# Patient Record
Sex: MALE | Race: WHITE | URBAN - METROPOLITAN AREA
[De-identification: names, ages, dates, MRNs, and addresses within clinical notes are randomized per-mention and may not be internally consistent; named-entity substitution may affect disease eponyms.]

---

## 2022-03-19 PROBLEM — Z79.4 UNCONTROLLED TYPE 2 DIABETES MELLITUS WITH HYPERGLYCEMIA, WITH LONG-TERM CURRENT USE OF INSULIN (HCC): Status: ACTIVE | Noted: 2018-01-05

## 2022-03-19 PROBLEM — E11.65 UNCONTROLLED TYPE 2 DIABETES MELLITUS WITH HYPERGLYCEMIA, WITH LONG-TERM CURRENT USE OF INSULIN (HCC): Status: ACTIVE | Noted: 2018-01-05

## 2022-05-11 LAB
AVERAGE GLUCOSE: ABNORMAL
HBA1C MFR BLD: 7.6 %

## 2022-10-21 ENCOUNTER — OFFICE VISIT (OUTPATIENT)
Dept: ENDOCRINOLOGY | Age: 52
End: 2022-10-21

## 2022-10-21 VITALS
OXYGEN SATURATION: 99 % | WEIGHT: 204.2 LBS | SYSTOLIC BLOOD PRESSURE: 138 MMHG | HEART RATE: 71 BPM | BODY MASS INDEX: 29.3 KG/M2 | DIASTOLIC BLOOD PRESSURE: 90 MMHG

## 2022-10-21 DIAGNOSIS — E11.65 UNCONTROLLED TYPE 2 DIABETES MELLITUS WITH HYPERGLYCEMIA, WITH LONG-TERM CURRENT USE OF INSULIN (HCC): Primary | ICD-10-CM

## 2022-10-21 DIAGNOSIS — E23.0 HYPOGONADOTROPIC HYPOGONADISM IN MALE (HCC): ICD-10-CM

## 2022-10-21 DIAGNOSIS — E78.2 MIXED HYPERLIPIDEMIA: ICD-10-CM

## 2022-10-21 DIAGNOSIS — I10 ESSENTIAL HYPERTENSION: ICD-10-CM

## 2022-10-21 DIAGNOSIS — Z79.4 UNCONTROLLED TYPE 2 DIABETES MELLITUS WITH HYPERGLYCEMIA, WITH LONG-TERM CURRENT USE OF INSULIN (HCC): Primary | ICD-10-CM

## 2022-10-21 LAB — HBA1C MFR BLD: 7.3 %

## 2022-10-21 RX ORDER — BLOOD-GLUCOSE TRANSMITTER
EACH MISCELLANEOUS
COMMUNITY
Start: 2022-08-22 | End: 2022-10-21

## 2022-10-21 RX ORDER — METFORMIN HYDROCHLORIDE 500 MG/1
1000 TABLET, EXTENDED RELEASE ORAL 2 TIMES DAILY WITH MEALS
Qty: 360 TABLET | Refills: 3 | Status: SHIPPED | OUTPATIENT
Start: 2022-10-21

## 2022-10-21 RX ORDER — INSULIN GLARGINE 100 [IU]/ML
40 INJECTION, SOLUTION SUBCUTANEOUS NIGHTLY
Qty: 30 ML | Refills: 3 | Status: SHIPPED | OUTPATIENT
Start: 2022-10-21

## 2022-10-21 RX ORDER — BLOOD-GLUCOSE SENSOR
EACH MISCELLANEOUS
COMMUNITY
Start: 2022-08-20 | End: 2022-10-21

## 2022-10-21 RX ORDER — TESTOSTERONE ENANTHATE 75 MG/.5ML
75 INJECTION SUBCUTANEOUS
Qty: 6 ML | Refills: 3 | Status: SHIPPED | OUTPATIENT
Start: 2022-10-21

## 2022-10-21 RX ORDER — BLOOD-GLUCOSE TRANSMITTER
EACH MISCELLANEOUS
Qty: 1 EACH | Refills: 3 | Status: SHIPPED | OUTPATIENT
Start: 2022-10-21

## 2022-10-21 RX ORDER — LISINOPRIL 10 MG/1
10 TABLET ORAL DAILY
Qty: 90 TABLET | Refills: 3 | Status: SHIPPED | OUTPATIENT
Start: 2022-10-21

## 2022-10-21 RX ORDER — PEN NEEDLE, DIABETIC 32 GX 1/4"
NEEDLE, DISPOSABLE MISCELLANEOUS
Qty: 300 EACH | Refills: 3 | Status: SHIPPED | OUTPATIENT
Start: 2022-10-21

## 2022-10-21 RX ORDER — FENOFIBRATE 145 MG/1
145 TABLET, COATED ORAL DAILY
Qty: 90 TABLET | Refills: 3 | Status: SHIPPED | OUTPATIENT
Start: 2022-10-21

## 2022-10-21 RX ORDER — TIRZEPATIDE 5 MG/.5ML
5 INJECTION, SOLUTION SUBCUTANEOUS WEEKLY
Qty: 6 ML | Refills: 3 | Status: SHIPPED | OUTPATIENT
Start: 2022-10-21

## 2022-10-21 RX ORDER — INSULIN ASPART 100 [IU]/ML
INJECTION, SOLUTION INTRAVENOUS; SUBCUTANEOUS
Qty: 30 ML | Refills: 3 | Status: SHIPPED | OUTPATIENT
Start: 2022-10-21

## 2022-10-21 RX ORDER — EMPAGLIFLOZIN 25 MG/1
25 TABLET, FILM COATED ORAL DAILY
Qty: 90 TABLET | Refills: 3 | Status: SHIPPED | OUTPATIENT
Start: 2022-10-21

## 2022-10-21 RX ORDER — BLOOD-GLUCOSE SENSOR
EACH MISCELLANEOUS
Qty: 3 EACH | Refills: 3 | Status: SHIPPED | OUTPATIENT
Start: 2022-10-21

## 2022-10-21 RX ORDER — GLIMEPIRIDE 1 MG/1
1 TABLET ORAL 2 TIMES DAILY
Qty: 180 TABLET | Refills: 3 | Status: SHIPPED | OUTPATIENT
Start: 2022-10-21

## 2022-10-21 RX ORDER — ATORVASTATIN CALCIUM 10 MG/1
10 TABLET, FILM COATED ORAL NIGHTLY
Qty: 90 TABLET | Refills: 3 | Status: SHIPPED | OUTPATIENT
Start: 2022-10-21

## 2022-10-21 RX ORDER — ICOSAPENT ETHYL 1000 MG/1
2 CAPSULE ORAL 2 TIMES DAILY
Qty: 360 CAPSULE | Refills: 3 | Status: SHIPPED | OUTPATIENT
Start: 2022-10-21

## 2022-10-21 ASSESSMENT — ENCOUNTER SYMPTOMS: SHORTNESS OF BREATH: 0

## 2022-10-21 NOTE — PROGRESS NOTES
Lucien Ramirez MD, [facility]  [address], [address] [facility], [address] [place] [address]  Phone [phone]  Facsimile [phone]          Enedelia Self is a 46 y.o. male seen 10/21/2022 for follow up of type 2 diabetes mellitus        ASSESSMENT AND PLAN:    1. Uncontrolled type 2 diabetes mellitus with hyperglycemia, with long-term current use of insulin (HCC)  His glycemic control is suboptimal and his hemoglobin A1c is not at goal less than 7. I will try switching him from [facility] to [facility] as below. Eye exam negative 8/2021 and I asked` him to make an appointment. Urine microalbumin negative 5/2022.    - LANTUS SOLOSTAR 100 UNIT/ML injection pen; Inject 40 Units into the skin nightly  Dispense: 30 mL; Refill: 3  - NOVOLOG FLEXPEN 100 UNIT/ML injection pen; 10 units with supper in addition to correction scale 3/50>150 (up to 20 units daily)  Dispense: 30 mL; Refill: 3  - MOUNJARO 5 MG/0.5ML SOPN SC injection; Inject 0.5 mLs into the skin once a week  Dispense: 6 mL; Refill: 3  - metFORMIN (GLUCOPHAGE-XR) 500 MG extended release tablet; Take 2 tablets by mouth 2 times daily (with meals)  Dispense: 360 tablet; Refill: 3  - JARDIANCE 25 MG tablet; Take 1 tablet by mouth daily  Dispense: 90 tablet; Refill: 3  - glimepiride (AMARYL) 1 MG tablet; Take 1 tablet by mouth 2 times daily  Dispense: 180 tablet; Refill: 3  - Continuous Blood Gluc Sensor (DEXCOM G6 SENSOR) MISC; Change every 10 days  Dispense: 3 each; Refill: 3  - Continuous Blood Gluc Transmit (DEXCOM G6 TRANSMITTER) MISC; Change every 3 months  Dispense: 1 each; Refill: 3  - BD PEN NEEDLE MICRO U/F 32G X 6 MM MISC; 2-3 times per day  Dispense: 300 each; Refill: 3    2. Essential hypertension  Blood pressure not at goal less than 140/90, but it is normally well controlled. - lisinopril (PRINIVIL;ZESTRIL) 10 MG tablet; Take 1 tablet by mouth daily  Dispense: 90 tablet; Refill: 3    3.  Mixed hyperlipidemia  LDL at goal 5/2022. His triglycerides will hopefully improve with better glycemic control.    - atorvastatin (LIPITOR) 10 MG tablet; Take 1 tablet by mouth at bedtime  Dispense: 90 tablet; Refill: 3  - fenofibrate (TRICOR) 145 MG tablet; Take 1 tablet by mouth daily  Dispense: 90 tablet; Refill: 3  - VASCEPA 1 g CAPS capsule; Take 2 capsules by mouth 2 times daily  Dispense: 360 capsule; Refill: 3    4. Hypogonadotropic hypogonadism in male [facility])  His testosterone level was normal 5/2022. Hemoglobin and hematocrit normal 5/2022. PSA normal 5/2022.      - XYOSTED 75 MG/0.5ML SOAJ; Inject 75 mg into the skin every 7 days  Dispense: 6 mL; Refill: 3                 Procedures:     Interpretation of 72 hour glucose monitor: At least 72 hours of data were reviewed. The patient utilizes a Dexcom G6 continuous glucose monitoring system. The average glucose during the reviewed timeframe was 163 with a standard deviation of 47 (time in range 68%, hyperglycemia 31%, hypoglycemia less than 1%). He is having mild daytime hyperglycemia, significant postprandial hyperglycemia after supper and mild overnight hyperglycemia      Follow-up and Dispositions    Return in about 4 months (around 2/21/2023). HISTORY OF PRESENT ILLNESS:    DIABETES MELLITUS     Diagnosis: Type 2 diabetes mellitus diagnosed 2007. Diet: He is trying to limit carbohydrates, avoids simple sugars for the most part, no sweet tea or regular soft drinks. Exercise: No regular exercise. Monitoring: Dexcom G6. Blood glucose: By review of Dexcom G6: Average 163 (time in range 68%, hyperglycemia 31%). Hypoglycemia: +Hypoglycemic awareness. Hypoglycemia <1% per Lay Guillen. Hemoglobin A1c:   2/4/2011: 7.8.   5/9/2011: 7.0.   10/21/2011: 8.4.   1/20/2012: 9.3.   6/15/2012: 7.4.   7/13/2012: 8.3.   1/11/2013: 8.4.   5/10/2013: 7.5.   8/9/2013: 8.4.   10/2/2013: 8.5.   7/11/2014: 6.9.   10/31/2014: 7.2.   2/27/2015: 6.9. 7/10/2015: 6.9.   10/30/2015: 6.4.  10/30/2015: 6.3.  3/7/2016: 7.3.  7/13/2016: 6.7.  12/12/2016: 6.8.  4/20/2017: 6.6.    8/22/2017: 6.9.  1/5/2018: 7.4.  4/25/2018: 7.1.  9/28/2018: 6.7.  2/6/2019: 6.4.  7/12/2019: 6.7.  12/5/2019: 7.4.  4/21/2020: 7.2.  2/23/2021: 7.7.  6/25/2021: 6.5.  12/3/2021: 7.6.  5/11/2022: 7.6.  10/21/2022: 7.3. Microalbumin/Nephropathy:   1/10/2014: Cr 0.8 (GFR >90). 7/11/2014: Urine microalbumin/Cr 8.6.   1/9/2015: Cr 0.9 (GFR >90). 3/7/2016: Creatinine 0.9 (GFR >90) urine microalbumin/Cr 22.4.  7/13/2016: Creatinine 1.0 (GFR 85.1). 4/20/2017: Creatinine 1.0 (GFR 84.8), urine microalbumin/creatinine 4.1.    8/22/2017: Creatinine 1.1 (GFR 75.9), urine microalbumin/creatinine 6.8.  4/25/2018: Creatinine 0.9 (GFR 95.3), urine micro albumin/creatinine 2.8.  2/6/2019: Creatinine 1.0 (GFR 84.1), urine microalbumin/creatinine 4.3.  12/5/2019: Creatinine 0.9 (GFR 94.9), urine microalbumin/creatinine 3.7.   4/21/2020: Creatinine 1.0 (GFR 80.9). 2/23/2021: Creatinine 0.9 (.6), urine microalbumin/creatinine 2.6.  5/11/2022: Creatinine 0.9 (GFR 92.6), urine microalbumin/creatinine 2.4. Neuropathy/foot complications: Denies numbness, tingling, burning of feet. Cardiac issues: Coronary calcium score 1/2012 - Zero. Lipids:  2/4/2011: , , HDL 33.   5/9/2011: , , HDL 40, LDL 34, LFTs normal.   10/21/2011: , , HDL 33.   1/20/2012: , , HDL 35.   1/11/2013: , , HDL 43, LDL18.8.   8/9/2013: , , HDL 32, LDL 20, VLDL 96.   10/2/2013: , , HDL 41.   1/10/2014: , , HDL 38, LDL 53.2, VLDL 41.8.   1/9/2015: , , HDL 36, LDL 49, VLDL 58.   3/7/2016: Total cholesterol 187, triglycerides 655, HDL 35, LDL 66.  7/13/2016: Total cholesterol 137, triglycerides 275, HDL 30, LDL 52, VLDL 55.  4/20/2017:  Total cholesterol 147, triglycerides 155, HDL 34, LDL 82, VLDL 31.    8/22/2017: Total cholesterol 148, triglycerides 376, HDL 30, LDL 42.8, VLDL 75.2 (out of fenofibrate). 4/25/2018: Total cholesterol 140, triglycerides 292, HDL 32, LDL 49.6.  2/6/2019: Total cholesterol 131, triglycerides 140, HDL 37, LDL 66, VLDL 28.  12/5/2019: Total cholesterol 148, triglycerides 251, HDL 39, LDL 58.8, VLDL 50.2.  4/21/2020: Total cholesterol 140, triglycerides 195, HDL 44, LDL 57, VLDL 39.  2/23/2021: Total cholesterol 193, triglycerides 307, HDL 39, LDL 92.6, VLDL 61.4.  5/11/2022: Total cholesterol 181, triglycerides 381, , LDL 73.8, VLDL 76.2.    TSH:   7/13/2012: TSH 1.20.   8/9/2013: TSH 1.560.   3/7/2016: TSH 1.55.  4/20/2017: TSH 1.51.    8/22/2017: TSH 2.15.  4/25/2018: TSH 2.  2/6/2019: TSH 2.29.  12/5/2019: TSH 1.71.  5/11/2022: TSH 1.680. Retinopathy: Eye exam 8/2021 - no retinopathy. HYPOGONADISM    Presentation: Hypogonadotropic hypogonadism. Treatment: He was taking Androgel 1.62% and his PCP switched him to Axiron due to insurance. His PCP lowered his Axiron in 8/2015 when his hemoglobin was high. He saw urology regarding his rising PSA and his exam was normal.  His repeat PSA was lower and his urologist recommended observation. He switched from [address] [place] [address] to [facility] 2/2021. Labs:   5/9/2011: total testosterone 162 (250-1100), free testosterone 38.2 (), LH 4.0, FSH 5.4, prolactin 8.4, TSH 1.84, ferritin 149.   10/21/2011: total testosterone 272 (398-3941), free testosterone 6.66 (6-21). 1/20/2012: total testosterone 736.   7/13/2012: total testosterone 517, free testosterone 181.5.   1/11/2013: total testosterone 379, free testosterone 8.9.   8/9/2013: total testosterone 211.28.   1/10/2014: total testosterone 348, PSA 0.885, H/H 16.1/47.5.   1/9/2015: total testosterone 281, PSA 0.757, H/H 16.2/47. 30.   8/7/2015: Total testosterone 368, hemoglobin 18.6, hematocrit 54.3, PSA 0.780.  10/30/2015:  Total testosterone 362.  11/16/2015: Hemoglobin 17.7, hematocrit 52.1.  3/7/2016: Total testosterone 499, hemoglobin 17.3, hematocrit 49.9.  12/12/2016: Total testosterone 498, hemoglobin 16.6, hematocrit 49, PSA 0.602.  4/20/2017: Total testosterone 485.    8/22/2017: Total testosterone 328.  4/25/2018: Total testosterone 268, hemoglobin 16.8, hematocrit 48.2, PSA 0.685  2/6/2019: Total testosterone 380, hemoglobin 16.6, hematocrit 51.1, PSA 2.58.  3/28/2019: PSA 1.38.  12/5/2019: Total testosterone 405, hemoglobin 16.3, hematocrit 48.4, PSA 0.959.  2/23/2021: Total testosterone 449, hemoglobin 16.2, hematocrit 47.6, PSA 1.25.  6/25/2021: Total testosterone 258.0.  5/11/2022: Total testosterone 407, hemoglobin 17.0, hematocrit 50.5, PSA 0.878. Review of Systems   Constitutional:  Negative for fatigue. Weight decreased 3 pounds since last visit. Respiratory:  Negative for shortness of breath. Cardiovascular:  Negative for chest pain. Vital Signs:  BP (!) 138/90   Pulse 71   Wt 204 lb 3.2 oz (92.6 kg)   SpO2 99%   BMI 29.30 kg/m²     Wt Readings from Last 3 Encounters:   10/21/22 204 lb 3.2 oz (92.6 kg)   05/13/22 207 lb (93.9 kg)   12/03/21 208 lb (94.3 kg)       Physical Exam  Constitutional:       General: He is not in acute distress. Neck:      Thyroid: No thyroid mass or thyromegaly. Cardiovascular:      Rate and Rhythm: Normal rate and regular rhythm. Comments: DP pulses 2+ bilaterally. No edema. Musculoskeletal:      Comments: Bilateral 1st MTP bunions. Lymphadenopathy:      Cervical: No cervical adenopathy. Skin:     Comments: No lesions on feet. +Onychomycosis. Neurological:      Motor: No tremor. Comments: Monofilament intact. Ankle reflexes decreased.           Orders Placed This Encounter   Procedures    AMB POC HEMOGLOBIN A1C    NH CONTINUOUS GLUCOSE MONITORING ANALYSIS I&R         Current Outpatient Medications   Medication Sig Dispense Refill    LANTUS SOLOSTAR 100 UNIT/ML injection pen Inject 40 Units into the skin nightly 30 mL 3    NOVOLOG FLEXPEN 100 UNIT/ML injection pen 10 units with supper in addition to correction scale 3/50>150 (up to 20 units daily) 30 mL 3    atorvastatin (LIPITOR) 10 MG tablet Take 1 tablet by mouth at bedtime 90 tablet 3    fenofibrate (TRICOR) 145 MG tablet Take 1 tablet by mouth daily 90 tablet 3    VASCEPA 1 g CAPS capsule Take 2 capsules by mouth 2 times daily 360 capsule 3    lisinopril (PRINIVIL;ZESTRIL) 10 MG tablet Take 1 tablet by mouth daily 90 tablet 3    XYOSTED 75 MG/0.5ML SOAJ Inject 75 mg into the skin every 7 days 6 mL 3    MOUNJARO 5 MG/0.5ML SOPN SC injection Inject 0.5 mLs into the skin once a week 6 mL 3    metFORMIN (GLUCOPHAGE-XR) 500 MG extended release tablet Take 2 tablets by mouth 2 times daily (with meals) 360 tablet 3    JARDIANCE 25 MG tablet Take 1 tablet by mouth daily 90 tablet 3    glimepiride (AMARYL) 1 MG tablet Take 1 tablet by mouth 2 times daily 180 tablet 3    Continuous Blood Gluc Sensor (DEXCOM G6 SENSOR) MISC Change every 10 days 3 each 3    Continuous Blood Gluc Transmit (DEXCOM G6 TRANSMITTER) MISC Change every 3 months 1 each 3    BD PEN NEEDLE MICRO U/F 32G X 6 MM MISC 2-3 times per day 300 each 3    aspirin 81 MG EC tablet Take by mouth daily       No current facility-administered medications for this visit.

## 2022-12-01 ENCOUNTER — TELEPHONE (OUTPATIENT)
Dept: ENDOCRINOLOGY | Age: 52
End: 2022-12-01

## 2022-12-01 DIAGNOSIS — Z79.4 UNCONTROLLED TYPE 2 DIABETES MELLITUS WITH HYPERGLYCEMIA, WITH LONG-TERM CURRENT USE OF INSULIN (HCC): Primary | ICD-10-CM

## 2022-12-01 DIAGNOSIS — E11.65 UNCONTROLLED TYPE 2 DIABETES MELLITUS WITH HYPERGLYCEMIA, WITH LONG-TERM CURRENT USE OF INSULIN (HCC): Primary | ICD-10-CM

## 2022-12-01 RX ORDER — TIRZEPATIDE 12.5 MG/.5ML
12.5 INJECTION, SOLUTION SUBCUTANEOUS WEEKLY
Qty: 2 ML | Refills: 0 | Status: SHIPPED | OUTPATIENT
Start: 2022-12-01 | End: 2022-12-23

## 2022-12-01 RX ORDER — TIRZEPATIDE 10 MG/.5ML
10 INJECTION, SOLUTION SUBCUTANEOUS WEEKLY
Qty: 2 ML | Refills: 0 | Status: SHIPPED | OUTPATIENT
Start: 2022-12-01 | End: 2022-12-23

## 2022-12-01 RX ORDER — TIRZEPATIDE 7.5 MG/.5ML
7.5 INJECTION, SOLUTION SUBCUTANEOUS WEEKLY
Qty: 2 ML | Refills: 0 | Status: SHIPPED | OUTPATIENT
Start: 2022-12-01 | End: 2022-12-23

## 2022-12-01 RX ORDER — TIRZEPATIDE 15 MG/.5ML
15 INJECTION, SOLUTION SUBCUTANEOUS WEEKLY
Qty: 6 ML | Refills: 3 | Status: SHIPPED | OUTPATIENT
Start: 2022-12-01

## 2022-12-01 NOTE — TELEPHONE ENCOUNTER
I would like for him to increase his Moujaro in 2.5 mg increments every 4 weeks as tolerated to a maximum dose of 15 mg weekly. He should let me know if he develops intolerable GI side effects with any of the doses.

## 2022-12-01 NOTE — TELEPHONE ENCOUNTER
Patient stated that the [facility] [place] not controlling his blood sugar as expected and would like the dose increased.

## 2022-12-01 NOTE — TELEPHONE ENCOUNTER
Left detailed message on personal voicemail explaining each dose and for patient to call if he begins to have any extreme GI side effects or issues at his pharmacy.

## 2023-02-17 ENCOUNTER — OFFICE VISIT (OUTPATIENT)
Dept: ENDOCRINOLOGY | Age: 53
End: 2023-02-17

## 2023-02-17 VITALS
OXYGEN SATURATION: 96 % | HEART RATE: 90 BPM | BODY MASS INDEX: 27.98 KG/M2 | WEIGHT: 195 LBS | DIASTOLIC BLOOD PRESSURE: 78 MMHG | SYSTOLIC BLOOD PRESSURE: 106 MMHG

## 2023-02-17 DIAGNOSIS — E23.0 HYPOGONADOTROPIC HYPOGONADISM IN MALE (HCC): ICD-10-CM

## 2023-02-17 DIAGNOSIS — Z79.4 UNCONTROLLED TYPE 2 DIABETES MELLITUS WITH HYPERGLYCEMIA, WITH LONG-TERM CURRENT USE OF INSULIN (HCC): Primary | ICD-10-CM

## 2023-02-17 DIAGNOSIS — E78.2 MIXED HYPERLIPIDEMIA: ICD-10-CM

## 2023-02-17 DIAGNOSIS — I10 ESSENTIAL HYPERTENSION: ICD-10-CM

## 2023-02-17 DIAGNOSIS — E11.65 UNCONTROLLED TYPE 2 DIABETES MELLITUS WITH HYPERGLYCEMIA, WITH LONG-TERM CURRENT USE OF INSULIN (HCC): Primary | ICD-10-CM

## 2023-02-17 LAB — HBA1C MFR BLD: 7.1 %

## 2023-02-17 RX ORDER — FENOFIBRATE 145 MG/1
145 TABLET, COATED ORAL DAILY
Qty: 90 TABLET | Refills: 3 | Status: SHIPPED | OUTPATIENT
Start: 2023-02-17

## 2023-02-17 RX ORDER — LIRAGLUTIDE 6 MG/ML
1.8 INJECTION SUBCUTANEOUS DAILY
COMMUNITY
Start: 2016-03-11 | End: 2023-02-17

## 2023-02-17 RX ORDER — PEN NEEDLE, DIABETIC 32 GX 1/4"
NEEDLE, DISPOSABLE MISCELLANEOUS
Qty: 300 EACH | Refills: 3 | Status: SHIPPED | OUTPATIENT
Start: 2023-02-17

## 2023-02-17 RX ORDER — BLOOD-GLUCOSE SENSOR
EACH MISCELLANEOUS
Qty: 3 EACH | Refills: 3 | Status: SHIPPED | OUTPATIENT
Start: 2023-02-17

## 2023-02-17 RX ORDER — TIRZEPATIDE 15 MG/.5ML
15 INJECTION, SOLUTION SUBCUTANEOUS WEEKLY
Qty: 6 ML | Refills: 3 | Status: SHIPPED | OUTPATIENT
Start: 2023-02-17

## 2023-02-17 RX ORDER — TIRZEPATIDE 7.5 MG/.5ML
7.5 INJECTION, SOLUTION SUBCUTANEOUS WEEKLY
Qty: 2 ML | Refills: 0 | Status: SHIPPED | OUTPATIENT
Start: 2023-02-17 | End: 2023-03-11

## 2023-02-17 RX ORDER — LISINOPRIL 10 MG/1
10 TABLET ORAL DAILY
Qty: 90 TABLET | Refills: 3 | Status: SHIPPED | OUTPATIENT
Start: 2023-02-17

## 2023-02-17 RX ORDER — LISINOPRIL 10 MG/1
10 TABLET ORAL DAILY
COMMUNITY
End: 2023-02-17

## 2023-02-17 RX ORDER — TIRZEPATIDE 12.5 MG/.5ML
12.5 INJECTION, SOLUTION SUBCUTANEOUS WEEKLY
Qty: 2 ML | Refills: 0 | Status: SHIPPED | OUTPATIENT
Start: 2023-02-17 | End: 2023-03-11

## 2023-02-17 RX ORDER — TESTOSTERONE ENANTHATE 75 MG/.5ML
75 INJECTION SUBCUTANEOUS
Qty: 6 ML | Refills: 3 | Status: SHIPPED | OUTPATIENT
Start: 2023-02-17

## 2023-02-17 RX ORDER — METFORMIN HYDROCHLORIDE 500 MG/1
1000 TABLET, EXTENDED RELEASE ORAL 2 TIMES DAILY WITH MEALS
Qty: 360 TABLET | Refills: 3 | Status: SHIPPED | OUTPATIENT
Start: 2023-02-17

## 2023-02-17 RX ORDER — ICOSAPENT ETHYL 1000 MG/1
2 CAPSULE ORAL 2 TIMES DAILY
Qty: 360 CAPSULE | Refills: 3 | Status: SHIPPED | OUTPATIENT
Start: 2023-02-17

## 2023-02-17 RX ORDER — ATORVASTATIN CALCIUM 10 MG/1
10 TABLET, FILM COATED ORAL NIGHTLY
Qty: 90 TABLET | Refills: 3 | Status: SHIPPED | OUTPATIENT
Start: 2023-02-17

## 2023-02-17 RX ORDER — EMPAGLIFLOZIN 25 MG/1
25 TABLET, FILM COATED ORAL DAILY
Qty: 90 TABLET | Refills: 3 | Status: SHIPPED | OUTPATIENT
Start: 2023-02-17

## 2023-02-17 RX ORDER — GLIMEPIRIDE 1 MG/1
1 TABLET ORAL 2 TIMES DAILY
Qty: 180 TABLET | Refills: 3 | Status: SHIPPED | OUTPATIENT
Start: 2023-02-17

## 2023-02-17 RX ORDER — INSULIN GLARGINE 100 [IU]/ML
40 INJECTION, SOLUTION SUBCUTANEOUS NIGHTLY
Qty: 30 ML | Refills: 3 | Status: SHIPPED | OUTPATIENT
Start: 2023-02-17

## 2023-02-17 RX ORDER — INSULIN ASPART 100 [IU]/ML
INJECTION, SOLUTION INTRAVENOUS; SUBCUTANEOUS
Qty: 30 ML | Refills: 3 | Status: SHIPPED | OUTPATIENT
Start: 2023-02-17

## 2023-02-17 RX ORDER — BLOOD-GLUCOSE TRANSMITTER
EACH MISCELLANEOUS
Qty: 1 EACH | Refills: 3 | Status: SHIPPED | OUTPATIENT
Start: 2023-02-17

## 2023-02-17 ASSESSMENT — ENCOUNTER SYMPTOMS: SHORTNESS OF BREATH: 0

## 2023-02-17 NOTE — PROGRESS NOTES
Lucien GOMES [facility], [place], [facility]  [address], [address] [facility], [address] [place] [address] [place]  Phone [phone]  Facsimile [phone]          Neftali Hernandez is a 45 y.o. male seen 2/17/2023 for follow up of type 2 diabetes mellitus        ASSESSMENT AND PLAN:    1. Uncontrolled type 2 diabetes mellitus with hyperglycemia, with long-term current use of insulin (HCC)  His glycemic control is suboptimal and his hemoglobin A1c is not at goal less than 7. I will again try switching him from [facility] to [facility] as below. Eye exam negative 8/2021 and he has an appointment scheduled 4/2023. Urine microalbumin negative 5/2022. Update labs prior to next visit. - LANTUS SOLOSTAR 100 UNIT/ML injection pen; Inject 40 Units into the skin nightly  Dispense: 30 mL; Refill: 3  - NOVOLOG FLEXPEN 100 UNIT/ML injection pen; 10 units with supper in addition to correction scale 3/50>150 (up to 20 units daily)  Dispense: 30 mL; Refill: 3  - MOUNJARO 7.5 MG/0.5ML SOPN SC injection; Inject 0.5 mLs into the skin once a week for 4 doses  Dispense: 2 mL; Refill: 0  - MOUNJARO 12.5 MG/0.5ML SOPN SC injection; Inject 0.5 mLs into the skin once a week for 4 doses  Dispense: 2 mL; Refill: 0  - MOUNJARO 15 MG/0.5ML SOPN SC injection; Inject 0.5 mLs into the skin once a week  Dispense: 6 mL; Refill: 3  - metFORMIN (GLUCOPHAGE-XR) 500 MG extended release tablet; Take 2 tablets by mouth 2 times daily (with meals)  Dispense: 360 tablet; Refill: 3  - JARDIANCE 25 MG tablet; Take 1 tablet by mouth daily  Dispense: 90 tablet; Refill: 3  - glimepiride (AMARYL) 1 MG tablet; Take 1 tablet by mouth 2 times daily  Dispense: 180 tablet; Refill: 3  - Continuous Blood Gluc Sensor (DEXCOM G6 SENSOR) MISC; Change every 10 days  Dispense: 3 each; Refill: 3  - Continuous Blood Gluc Transmit (DEXCOM G6 TRANSMITTER) MISC; Change every 3 months  Dispense: 1 each;  Refill: 3  - BD PEN NEEDLE MICRO U/F 32G X 6 MM MISC; 2-3 times per day  Dispense: 300 each; Refill: 3    2. Essential hypertension  Blood pressure at goal less than 130/80.    - lisinopril (PRINIVIL;ZESTRIL) 10 MG tablet; Take 1 tablet by mouth daily  Dispense: 90 tablet; Refill: 3    3. Mixed hyperlipidemia  LDL at goal 5/2022. His triglycerides will hopefully improve with better glycemic control.    - atorvastatin (LIPITOR) 10 MG tablet; Take 1 tablet by mouth at bedtime  Dispense: 90 tablet; Refill: 3  - fenofibrate (TRICOR) 145 MG tablet; Take 1 tablet by mouth daily  Dispense: 90 tablet; Refill: 3  - VASCEPA 1 g CAPS capsule; Take 2 capsules by mouth 2 times daily  Dispense: 360 capsule; Refill: 3    4. Hypogonadotropic hypogonadism in male [facility])  His testosterone level was normal 5/2022. Hemoglobin and hematocrit normal 5/2022. PSA normal 5/2022.      - XYOSTED 75 MG/0.5ML SOAJ; Inject 75 mg into the skin every 7 days  Dispense: 6 mL; Refill: 3               Procedures:     Interpretation of 72 hour glucose monitor: At least 72 hours of data were reviewed. The patient utilizes a Dexcom G6 continuous glucose monitoring system. The average glucose during the reviewed timeframe was 152 with a standard deviation of 50 (time in range 74%, hyperglycemia 24%, hypoglycemia less than 2%). He is having mild daytime and overnight hyperglycemia. Follow-up and Dispositions    Return in about 4 months (around 6/17/2023). HISTORY OF PRESENT ILLNESS:    DIABETES MELLITUS     Diagnosis: Type 2 diabetes mellitus diagnosed 2007. Diet: He is trying to limit carbohydrates, avoids simple sugars for the most part, no sweet tea or regular soft drinks. Exercise: No regular exercise. Monitoring: Dexcom G6. Blood glucose: Dexcom download reviewed. Average 152 (time in range 74%, hyperglycemia 24%). Hypoglycemia: +Hypoglycemic awareness. Hypoglycemia <2% per Sam.     Hemoglobin A1c:   2/4/2011: 7.8.   5/9/2011: 7.0.   10/21/2011: 8.4.   1/20/2012: 9.3.   6/15/2012: 7.4.   7/13/2012: 8.3.   1/11/2013: 8.4.   5/10/2013: 7.5.   8/9/2013: 8.4.   10/2/2013: 8.5.   7/11/2014: 6.9.   10/31/2014: 7.2.   2/27/2015: 6.9.   7/10/2015: 6.9.   10/30/2015: 6.4.  10/30/2015: 6.3.  3/7/2016: 7.3.  7/13/2016: 6.7.  12/12/2016: 6.8.  4/20/2017: 6.6.    8/22/2017: 6.9.  1/5/2018: 7.4.  4/25/2018: 7.1.  9/28/2018: 6.7.  2/6/2019: 6.4.  7/12/2019: 6.7.  12/5/2019: 7.4.  4/21/2020: 7.2.  2/23/2021: 7.7.  6/25/2021: 6.5.  12/3/2021: 7.6.  5/11/2022: 7.6.  10/21/2022: 7.3.  2/17/2023: 7.1. Microalbumin/Nephropathy:   1/10/2014: Cr 0.8 (GFR >90). 7/11/2014: Urine microalbumin/Cr 8.6.   1/9/2015: Cr 0.9 (GFR >90). 3/7/2016: Creatinine 0.9 (GFR >90) urine microalbumin/Cr 22.4.  7/13/2016: Creatinine 1.0 (GFR 85.1). 4/20/2017: Creatinine 1.0 (GFR 84.8), urine microalbumin/creatinine 4.1.    8/22/2017: Creatinine 1.1 (GFR 75.9), urine microalbumin/creatinine 6.8.  4/25/2018: Creatinine 0.9 (GFR 95.3), urine micro albumin/creatinine 2.8.  2/6/2019: Creatinine 1.0 (GFR 84.1), urine microalbumin/creatinine 4.3.  12/5/2019: Creatinine 0.9 (GFR 94.9), urine microalbumin/creatinine 3.7.   4/21/2020: Creatinine 1.0 (GFR 80.9). 2/23/2021: Creatinine 0.9 (.6), urine microalbumin/creatinine 2.6.  5/11/2022: Creatinine 0.9 (GFR 92.6), urine microalbumin/creatinine 2.4. Neuropathy/foot complications: Denies numbness, tingling, burning of feet. Cardiac issues: Coronary calcium score 1/2012 - Zero.       Lipids:  2/4/2011: , , HDL 33.   5/9/2011: , , HDL 40, LDL 34, LFTs normal.   10/21/2011: , , HDL 33.   1/20/2012: , , HDL 35.   1/11/2013: , , HDL 43, LDL18.8.   8/9/2013: , , HDL 32, LDL 20, VLDL 96.   10/2/2013: , , HDL 41.   1/10/2014: , , HDL 38, LDL 53.2, VLDL 41.8.   1/9/2015: , , HDL 36, LDL 49, VLDL 58.   3/7/2016: Total cholesterol 187, triglycerides 655, HDL 35, LDL 66.  7/13/2016: Total cholesterol 137, triglycerides 275, HDL 30, LDL 52, VLDL 55.  4/20/2017: Total cholesterol 147, triglycerides 155, HDL 34, LDL 82, VLDL 31.    8/22/2017: Total cholesterol 148, triglycerides 376, HDL 30, LDL 42.8, VLDL 75.2 (out of fenofibrate). 4/25/2018: Total cholesterol 140, triglycerides 292, HDL 32, LDL 49.6.  2/6/2019: Total cholesterol 131, triglycerides 140, HDL 37, LDL 66, VLDL 28.  12/5/2019: Total cholesterol 148, triglycerides 251, HDL 39, LDL 58.8, VLDL 50.2.  4/21/2020: Total cholesterol 140, triglycerides 195, HDL 44, LDL 57, VLDL 39.  2/23/2021: Total cholesterol 193, triglycerides 307, HDL 39, LDL 92.6, VLDL 61.4.  5/11/2022: Total cholesterol 181, triglycerides 381, , LDL 73.8, VLDL 76.2.    TSH:   7/13/2012: TSH 1.20.   8/9/2013: TSH 1.560.   3/7/2016: TSH 1.55.  4/20/2017: TSH 1.51.    8/22/2017: TSH 2.15.  4/25/2018: TSH 2.  2/6/2019: TSH 2.29.  12/5/2019: TSH 1.71.  5/11/2022: TSH 1.680. Retinopathy: Eye exam 8/2021 - no retinopathy. He has an appointment scheduled 4/7/2023. HYPOGONADISM    Presentation: Hypogonadotropic hypogonadism. Treatment: He was taking Androgel 1.62% and his PCP switched him to Axiron due to insurance. His PCP lowered his Axiron in 8/2015 when his hemoglobin was high. He saw urology regarding his rising PSA and his exam was normal.  His repeat PSA was lower and his urologist recommended observation. He switched from [address] [place] [address] to [facility] 2/2021. Labs:   5/9/2011: total testosterone 162 (250-1100), free testosterone 38.2 (), LH 4.0, FSH 5.4, prolactin 8.4, TSH 1.84, ferritin 149.   10/21/2011: total testosterone 272 (639-1795), free testosterone 6.66 (6-21). 1/20/2012: total testosterone 736.   7/13/2012: total testosterone 517, free testosterone 181.5.   1/11/2013: total testosterone 379, free testosterone 8.9.   8/9/2013: total testosterone 211.28.   1/10/2014: total testosterone 348, PSA 0.885, H/H 16.1/47. 5. 1/9/2015: total testosterone 281, PSA 0.757, H/H 16.2/47. 30.   8/7/2015: Total testosterone 368, hemoglobin 18.6, hematocrit 54.3, PSA 0.780.  10/30/2015: Total testosterone 362.  11/16/2015: Hemoglobin 17.7, hematocrit 52.1.  3/7/2016: Total testosterone 499, hemoglobin 17.3, hematocrit 49.9.  12/12/2016: Total testosterone 498, hemoglobin 16.6, hematocrit 49, PSA 0.602.  4/20/2017: Total testosterone 485.    8/22/2017: Total testosterone 328.  4/25/2018: Total testosterone 268, hemoglobin 16.8, hematocrit 48.2, PSA 0.685  2/6/2019: Total testosterone 380, hemoglobin 16.6, hematocrit 51.1, PSA 2.58.  3/28/2019: PSA 1.38.  12/5/2019: Total testosterone 405, hemoglobin 16.3, hematocrit 48.4, PSA 0.959.  2/23/2021: Total testosterone 449, hemoglobin 16.2, hematocrit 47.6, PSA 1.25.  6/25/2021: Total testosterone 258.0.  5/11/2022: Total testosterone 407, hemoglobin 17.0, hematocrit 50.5, PSA 0.878. Review of Systems   Constitutional:  Negative for fatigue. Weight decreased 9 pounds since last visit. Respiratory:  Negative for shortness of breath. Cardiovascular:  Negative for chest pain. Vital Signs:  /78   Pulse 90   Wt 195 lb (88.5 kg)   SpO2 96%   BMI 27.98 kg/m²     Wt Readings from Last 3 Encounters:   02/17/23 195 lb (88.5 kg)   10/21/22 204 lb 3.2 oz (92.6 kg)   05/13/22 207 lb (93.9 kg)       Physical Exam  Constitutional:       General: He is not in acute distress. Neck:      Thyroid: No thyroid mass or thyromegaly. Cardiovascular:      Rate and Rhythm: Normal rate and regular rhythm. Comments: DP pulses 2+ bilaterally. No edema. Musculoskeletal:      Comments: Bilateral 1st MTP bunions. Lymphadenopathy:      Cervical: No cervical adenopathy. Skin:     Comments: No lesions on feet. +Onychomycosis. Neurological:      Motor: No tremor. Comments: Monofilament intact. Ankle reflexes decreased.           Orders Placed This Encounter   Procedures Hemoglobin A1C     Standing Status:   Future     Standing Expiration Date:   2/17/2024    Comprehensive Metabolic Panel     Standing Status:   Future     Standing Expiration Date:   2/17/2024    Lipid Panel     Standing Status:   Future     Standing Expiration Date:   2/17/2024    Microalbumin / Creatinine Urine Ratio     Standing Status:   Future     Standing Expiration Date:   2/17/2024    TSH with Reflex     Standing Status:   Future     Standing Expiration Date:   2/17/2024    Testosterone Total Only, Male     Standing Status:   Future     Standing Expiration Date:   2/17/2024    CBC     Standing Status:   Future     Standing Expiration Date:   2/17/2024    PSA, Diagnostic     Standing Status:   Future     Standing Expiration Date:   2/17/2024    AMB POC HEMOGLOBIN A1C    ID CONTINUOUS GLUCOSE MONITORING ANALYSIS I&R         Current Outpatient Medications   Medication Sig Dispense Refill    LANTUS SOLOSTAR 100 UNIT/ML injection pen Inject 40 Units into the skin nightly 30 mL 3    MOUNJARO 7.5 MG/0.5ML SOPN SC injection Inject 0.5 mLs into the skin once a week for 4 doses 2 mL 0    MOUNJARO 12.5 MG/0.5ML SOPN SC injection Inject 0.5 mLs into the skin once a week for 4 doses 2 mL 0    MOUNJARO 15 MG/0.5ML SOPN SC injection Inject 0.5 mLs into the skin once a week 6 mL 3    metFORMIN (GLUCOPHAGE-XR) 500 MG extended release tablet Take 2 tablets by mouth 2 times daily (with meals) 360 tablet 3    NOVOLOG FLEXPEN 100 UNIT/ML injection pen 10 units with supper in addition to correction scale 3/50>150 (up to 20 units daily) 30 mL 3    JARDIANCE 25 MG tablet Take 1 tablet by mouth daily 90 tablet 3    glimepiride (AMARYL) 1 MG tablet Take 1 tablet by mouth 2 times daily 180 tablet 3    Continuous Blood Gluc Sensor (DEXCOM G6 SENSOR) MISC Change every 10 days 3 each 3    Continuous Blood Gluc Transmit (DEXCOM G6 TRANSMITTER) MISC Change every 3 months 1 each 3    BD PEN NEEDLE MICRO U/F 32G X 6 MM MISC 2-3 times per day 300 each 3    lisinopril (PRINIVIL;ZESTRIL) 10 MG tablet Take 1 tablet by mouth daily 90 tablet 3    atorvastatin (LIPITOR) 10 MG tablet Take 1 tablet by mouth at bedtime 90 tablet 3    fenofibrate (TRICOR) 145 MG tablet Take 1 tablet by mouth daily 90 tablet 3    VASCEPA 1 g CAPS capsule Take 2 capsules by mouth 2 times daily 360 capsule 3    XYOSTED 75 MG/0.5ML SOAJ Inject 75 mg into the skin every 7 days 6 mL 3    aspirin 81 MG EC tablet Take by mouth daily       No current facility-administered medications for this visit.

## 2023-03-29 ENCOUNTER — TELEPHONE (OUTPATIENT)
Dept: ENDOCRINOLOGY | Age: 53
End: 2023-03-29

## 2023-03-29 RX ORDER — ICOSAPENT ETHYL 1000 MG/1
CAPSULE ORAL
Qty: 360 CAPSULE | Refills: 3 | Status: SHIPPED | OUTPATIENT
Start: 2023-03-29

## 2023-03-29 NOTE — TELEPHONE ENCOUNTER
Called patient, LVM stating that the generic brand of vascepa was covered under his insurance and was ready for  at the pharmacy.

## 2023-03-29 NOTE — TELEPHONE ENCOUNTER
Pt needs a PA on Vascepa. Insurance only covers generic. Generic rx will still require a PA. Could you send rx for generic so we can work on the [place].

## 2023-03-29 NOTE — TELEPHONE ENCOUNTER
PA was started for Icosapent Ethyl. This medication or product is on your plan's list of covered drugs. Prior authorization is not required at this time. If your pharmacy has questions regarding the processing of your prescription, please have them call the pharmacy help desk at [phone].

## 2023-07-03 LAB
AVERAGE GLUCOSE: ABNORMAL
HBA1C MFR BLD: 6.9 %
PROSTATE SPECIFIC ANTIGEN: 1.51 NG/ML

## 2023-07-06 DIAGNOSIS — Z79.4 UNCONTROLLED TYPE 2 DIABETES MELLITUS WITH HYPERGLYCEMIA, WITH LONG-TERM CURRENT USE OF INSULIN (HCC): ICD-10-CM

## 2023-07-06 DIAGNOSIS — E11.65 UNCONTROLLED TYPE 2 DIABETES MELLITUS WITH HYPERGLYCEMIA, WITH LONG-TERM CURRENT USE OF INSULIN (HCC): ICD-10-CM

## 2023-07-07 ENCOUNTER — OFFICE VISIT (OUTPATIENT)
Dept: ENDOCRINOLOGY | Age: 53
End: 2023-07-07

## 2023-07-07 VITALS
HEART RATE: 80 BPM | OXYGEN SATURATION: 97 % | SYSTOLIC BLOOD PRESSURE: 111 MMHG | WEIGHT: 188.8 LBS | BODY MASS INDEX: 27.09 KG/M2 | DIASTOLIC BLOOD PRESSURE: 69 MMHG

## 2023-07-07 DIAGNOSIS — E11.65 CONTROLLED TYPE 2 DIABETES MELLITUS WITH HYPERGLYCEMIA, WITH LONG-TERM CURRENT USE OF INSULIN (HCC): Primary | ICD-10-CM

## 2023-07-07 DIAGNOSIS — I10 ESSENTIAL HYPERTENSION: ICD-10-CM

## 2023-07-07 DIAGNOSIS — E23.0 HYPOGONADOTROPIC HYPOGONADISM IN MALE (HCC): ICD-10-CM

## 2023-07-07 DIAGNOSIS — E78.2 MIXED HYPERLIPIDEMIA: ICD-10-CM

## 2023-07-07 DIAGNOSIS — Z79.4 CONTROLLED TYPE 2 DIABETES MELLITUS WITH HYPERGLYCEMIA, WITH LONG-TERM CURRENT USE OF INSULIN (HCC): Primary | ICD-10-CM

## 2023-07-07 RX ORDER — INSULIN GLARGINE 100 [IU]/ML
25 INJECTION, SOLUTION SUBCUTANEOUS NIGHTLY
Qty: 30 ML | Refills: 3 | Status: SHIPPED | OUTPATIENT
Start: 2023-07-07

## 2023-07-07 RX ORDER — PROCHLORPERAZINE 25 MG/1
SUPPOSITORY RECTAL
Qty: 1 EACH | Refills: 3 | Status: SHIPPED | OUTPATIENT
Start: 2023-07-07

## 2023-07-07 RX ORDER — ATORVASTATIN CALCIUM 20 MG/1
20 TABLET, FILM COATED ORAL NIGHTLY
Qty: 90 TABLET | Refills: 3 | Status: SHIPPED | OUTPATIENT
Start: 2023-07-07

## 2023-07-07 RX ORDER — ATORVASTATIN CALCIUM 20 MG/1
20 TABLET, FILM COATED ORAL DAILY
COMMUNITY
End: 2023-07-07 | Stop reason: SDUPTHER

## 2023-07-07 RX ORDER — EMPAGLIFLOZIN 25 MG/1
25 TABLET, FILM COATED ORAL DAILY
Qty: 90 TABLET | Refills: 3 | Status: SHIPPED | OUTPATIENT
Start: 2023-07-07

## 2023-07-07 RX ORDER — INSULIN ASPART 100 [IU]/ML
INJECTION, SOLUTION INTRAVENOUS; SUBCUTANEOUS
Qty: 30 ML | Refills: 3 | Status: SHIPPED | OUTPATIENT
Start: 2023-07-07

## 2023-07-07 RX ORDER — TIRZEPATIDE 15 MG/.5ML
15 INJECTION, SOLUTION SUBCUTANEOUS WEEKLY
Qty: 6 ML | Refills: 3 | Status: SHIPPED | OUTPATIENT
Start: 2023-07-07

## 2023-07-07 RX ORDER — PEN NEEDLE, DIABETIC 32 GX 1/4"
NEEDLE, DISPOSABLE MISCELLANEOUS
Qty: 300 EACH | Refills: 3 | Status: SHIPPED | OUTPATIENT
Start: 2023-07-07

## 2023-07-07 RX ORDER — TIRZEPATIDE 12.5 MG/.5ML
12.5 INJECTION, SOLUTION SUBCUTANEOUS WEEKLY
Qty: 6 ML | Refills: 3 | Status: SHIPPED | OUTPATIENT
Start: 2023-07-07

## 2023-07-07 RX ORDER — LISINOPRIL 10 MG/1
10 TABLET ORAL DAILY
Qty: 90 TABLET | Refills: 3 | Status: SHIPPED | OUTPATIENT
Start: 2023-07-07

## 2023-07-07 RX ORDER — PROCHLORPERAZINE 25 MG/1
SUPPOSITORY RECTAL
Qty: 3 EACH | Refills: 3 | Status: SHIPPED | OUTPATIENT
Start: 2023-07-07

## 2023-07-07 RX ORDER — TIRZEPATIDE 12.5 MG/.5ML
12.5 INJECTION, SOLUTION SUBCUTANEOUS WEEKLY
Qty: 2 ML | Refills: 0 | OUTPATIENT
Start: 2023-07-07 | End: 2023-07-29

## 2023-07-07 RX ORDER — METFORMIN HYDROCHLORIDE 500 MG/1
1000 TABLET, EXTENDED RELEASE ORAL 2 TIMES DAILY WITH MEALS
Qty: 360 TABLET | Refills: 3 | Status: SHIPPED | OUTPATIENT
Start: 2023-07-07

## 2023-07-07 RX ORDER — GLIMEPIRIDE 1 MG/1
1 TABLET ORAL 2 TIMES DAILY
Qty: 180 TABLET | Refills: 3 | Status: SHIPPED | OUTPATIENT
Start: 2023-07-07

## 2023-07-07 RX ORDER — TESTOSTERONE CYPIONATE 200 MG/ML
100 VIAL (ML) INTRAMUSCULAR
Qty: 4 ML | Refills: 5 | Status: SHIPPED | OUTPATIENT
Start: 2023-07-07

## 2023-07-07 RX ORDER — ICOSAPENT ETHYL 1000 MG/1
CAPSULE ORAL
Qty: 360 CAPSULE | Refills: 3 | Status: SHIPPED | OUTPATIENT
Start: 2023-07-07

## 2023-07-07 ASSESSMENT — ENCOUNTER SYMPTOMS: SHORTNESS OF BREATH: 0

## 2023-07-07 NOTE — PROGRESS NOTES
day  Dispense: 300 each; Refill: 3    2. Essential hypertension  Blood pressure at goal less than 130/80.    - lisinopril (PRINIVIL;ZESTRIL) 10 MG tablet; Take 1 tablet by mouth daily  Dispense: 90 tablet; Refill: 3    3. Mixed hyperlipidemia  LDL at goal less than 70 7/2023. He has a history of severe hypertriglyceridemia, but his triglycerides have been better recently. His cardiologist stopped his fenofibrate.    - atorvastatin (LIPITOR) 20 MG tablet; Take 1 tablet by mouth at bedtime  Dispense: 90 tablet; Refill: 3  - Icosapent Ethyl (VASCEPA) 1 g CAPS capsule; Take 2 capsules by mouth 2 times daily  Dispense: 360 capsule; Refill: 3    4. Hypogonadotropic hypogonadism in male [facility])  His testosterone level was normal 7/2023. Hemoglobin and hematocrit normal 7/2023. PSA normal 7/2023.      - Testosterone Cypionate 200 MG/ML SOLN; Inject 100 mg as directed every 7 days Max Daily Amount: 100 mg  Dispense: 4 mL; Refill: 5  - Needle, Disp, (HYPODERMIC NEEDLE 34ER6-5/2\") 18G X 1-1/2\" MISC; Every 2 weeks  Dispense: 25 each; Refill: 1  - Needle, Disp, (HYPODERMIC NEEDLE 23GX1\") 23G X 1\" MISC; Every 2 weeks  Dispense: 25 each; Refill: 1             Procedures:     Interpretation of 72 hour glucose monitor: At least 72 hours of data were reviewed. The patient utilizes a Dexcom G6 continuous glucose monitoring system. The average glucose during the reviewed timeframe was 147 with a standard deviation of 48 (time in range 75%, hyperglycemia 22%, hypoglycemia less than 3%). There is a pattern of postprandial hyperglycemia after lunch and supper. Follow-up and Dispositions    Return in about 4 months (around 11/7/2023). HISTORY OF PRESENT ILLNESS:    DIABETES MELLITUS     Diagnosis: Type 2 diabetes mellitus diagnosed 2007. Diet: He is trying to limit carbohydrates, avoids simple sugars for the most part, no sweet tea or regular soft drinks. Exercise: No regular exercise. Monitoring: Dexcom G6.

## 2023-07-10 ENCOUNTER — PATIENT MESSAGE (OUTPATIENT)
Dept: ENDOCRINOLOGY | Age: 53
End: 2023-07-10

## 2023-07-10 DIAGNOSIS — Z79.4 CONTROLLED TYPE 2 DIABETES MELLITUS WITH HYPERGLYCEMIA, WITH LONG-TERM CURRENT USE OF INSULIN (HCC): ICD-10-CM

## 2023-07-10 DIAGNOSIS — E11.65 CONTROLLED TYPE 2 DIABETES MELLITUS WITH HYPERGLYCEMIA, WITH LONG-TERM CURRENT USE OF INSULIN (HCC): ICD-10-CM

## 2023-07-11 RX ORDER — TIRZEPATIDE 15 MG/.5ML
15 INJECTION, SOLUTION SUBCUTANEOUS WEEKLY
Qty: 6 ML | Refills: 3 | Status: SHIPPED | OUTPATIENT
Start: 2023-07-11

## 2023-07-11 RX ORDER — TIRZEPATIDE 12.5 MG/.5ML
12.5 INJECTION, SOLUTION SUBCUTANEOUS WEEKLY
Qty: 6 ML | Refills: 3 | Status: SHIPPED | OUTPATIENT
Start: 2023-07-11

## 2023-07-13 NOTE — TELEPHONE ENCOUNTER
[address], [address] [place] [address] [address] [place] [address] [place] [address]   Phone:  [phone]  Fax:  [phone]    Stated 12. 5. or 15MG Mounjaro is not available only 5MG is in stock. LVM for pt to search a different pharm for rx availability, + notify the office so rx can be resent, then  [address] for available dose !!        PA FOR 12.5MG SENT VIA M  Key: [id number]    [facility] is reviewing your PA request. Typically an electronic response will be received within 24-72 hours. To check for an update later, open this request from your dashboard. You may close this dialog and return to your dashboard to perform other tasks.

## 2023-07-14 NOTE — TELEPHONE ENCOUNTER
Approvedon July 13  Request Reference Number: [id number]. MOUNJARO INJ 12.5/0.5 is approved through 07/13/2024. Your patient may now fill this prescription and it will be covered      LVM + sent mychart of Mounjaro update.

## 2023-07-18 ENCOUNTER — PATIENT MESSAGE (OUTPATIENT)
Dept: ENDOCRINOLOGY | Age: 53
End: 2023-07-18

## 2023-07-19 RX ORDER — TIRZEPATIDE 7.5 MG/.5ML
7.5 INJECTION, SOLUTION SUBCUTANEOUS WEEKLY
Qty: 6 ML | Refills: 3 | Status: SHIPPED | OUTPATIENT
Start: 2023-07-19

## 2023-07-19 RX ORDER — TIRZEPATIDE 10 MG/.5ML
10 INJECTION, SOLUTION SUBCUTANEOUS WEEKLY
Qty: 6 ML | Refills: 3 | Status: SHIPPED | OUTPATIENT
Start: 2023-07-19

## 2023-07-19 RX ORDER — TIRZEPATIDE 5 MG/.5ML
5 INJECTION, SOLUTION SUBCUTANEOUS WEEKLY
Qty: 6 ML | Refills: 3 | Status: SHIPPED | OUTPATIENT
Start: 2023-07-19

## 2023-08-15 DIAGNOSIS — E78.2 MIXED HYPERLIPIDEMIA: ICD-10-CM

## 2023-08-15 RX ORDER — ICOSAPENT ETHYL 1000 MG/1
CAPSULE ORAL
Qty: 360 CAPSULE | Refills: 3 | OUTPATIENT
Start: 2023-08-15

## 2023-08-15 RX ORDER — ICOSAPENT ETHYL 1000 MG/1
CAPSULE ORAL
Qty: 360 CAPSULE | Refills: 3 | Status: SHIPPED | OUTPATIENT
Start: 2023-08-15

## 2024-01-03 LAB
ESTIMATED AVERAGE GLUCOSE: ABNORMAL
HBA1C MFR BLD: 7.5 %
PROSTATE SPECIFIC ANTIGEN: 1.32 NG/ML

## 2024-01-05 ENCOUNTER — OFFICE VISIT (OUTPATIENT)
Dept: ENDOCRINOLOGY | Age: 54
End: 2024-01-05
Payer: COMMERCIAL

## 2024-01-05 VITALS
WEIGHT: 186 LBS | OXYGEN SATURATION: 97 % | SYSTOLIC BLOOD PRESSURE: 108 MMHG | HEART RATE: 72 BPM | DIASTOLIC BLOOD PRESSURE: 64 MMHG | BODY MASS INDEX: 26.69 KG/M2

## 2024-01-05 DIAGNOSIS — I10 ESSENTIAL HYPERTENSION: ICD-10-CM

## 2024-01-05 DIAGNOSIS — D75.1 POLYCYTHEMIA: ICD-10-CM

## 2024-01-05 DIAGNOSIS — E23.0 HYPOGONADOTROPIC HYPOGONADISM IN MALE (HCC): ICD-10-CM

## 2024-01-05 DIAGNOSIS — E11.65 UNCONTROLLED TYPE 2 DIABETES MELLITUS WITH HYPERGLYCEMIA, WITH LONG-TERM CURRENT USE OF INSULIN (HCC): Primary | ICD-10-CM

## 2024-01-05 DIAGNOSIS — E78.2 MIXED HYPERLIPIDEMIA: ICD-10-CM

## 2024-01-05 DIAGNOSIS — Z79.4 UNCONTROLLED TYPE 2 DIABETES MELLITUS WITH HYPERGLYCEMIA, WITH LONG-TERM CURRENT USE OF INSULIN (HCC): Primary | ICD-10-CM

## 2024-01-05 PROCEDURE — 99214 OFFICE O/P EST MOD 30 MIN: CPT | Performed by: INTERNAL MEDICINE

## 2024-01-05 PROCEDURE — 3078F DIAST BP <80 MM HG: CPT | Performed by: INTERNAL MEDICINE

## 2024-01-05 PROCEDURE — 3074F SYST BP LT 130 MM HG: CPT | Performed by: INTERNAL MEDICINE

## 2024-01-05 RX ORDER — PROCHLORPERAZINE 25 MG/1
SUPPOSITORY RECTAL
Qty: 1 EACH | Refills: 3 | Status: SHIPPED | OUTPATIENT
Start: 2024-01-05

## 2024-01-05 RX ORDER — PEN NEEDLE, DIABETIC 32 GX 1/4"
NEEDLE, DISPOSABLE MISCELLANEOUS
Qty: 300 EACH | Refills: 3 | Status: SHIPPED | OUTPATIENT
Start: 2024-01-05

## 2024-01-05 RX ORDER — EMPAGLIFLOZIN 25 MG/1
25 TABLET, FILM COATED ORAL DAILY
Qty: 90 TABLET | Refills: 3 | Status: SHIPPED | OUTPATIENT
Start: 2024-01-05

## 2024-01-05 RX ORDER — TIRZEPATIDE 12.5 MG/.5ML
12.5 INJECTION, SOLUTION SUBCUTANEOUS WEEKLY
Qty: 6 ML | Refills: 3 | Status: SHIPPED | OUTPATIENT
Start: 2024-01-05

## 2024-01-05 RX ORDER — INSULIN ASPART 100 [IU]/ML
INJECTION, SOLUTION INTRAVENOUS; SUBCUTANEOUS
Qty: 30 ML | Refills: 3 | Status: SHIPPED | OUTPATIENT
Start: 2024-01-05

## 2024-01-05 RX ORDER — ICOSAPENT ETHYL 1000 MG/1
CAPSULE ORAL
Qty: 360 CAPSULE | Refills: 3 | Status: SHIPPED | OUTPATIENT
Start: 2024-01-05

## 2024-01-05 RX ORDER — LISINOPRIL 10 MG/1
10 TABLET ORAL DAILY
Qty: 90 TABLET | Refills: 3 | Status: SHIPPED | OUTPATIENT
Start: 2024-01-05

## 2024-01-05 RX ORDER — ATORVASTATIN CALCIUM 20 MG/1
20 TABLET, FILM COATED ORAL NIGHTLY
Qty: 90 TABLET | Refills: 3 | Status: SHIPPED | OUTPATIENT
Start: 2024-01-05

## 2024-01-05 RX ORDER — INSULIN GLARGINE 100 [IU]/ML
25 INJECTION, SOLUTION SUBCUTANEOUS NIGHTLY
Qty: 30 ML | Refills: 3 | Status: SHIPPED | OUTPATIENT
Start: 2024-01-05

## 2024-01-05 RX ORDER — GLIMEPIRIDE 1 MG/1
1 TABLET ORAL 2 TIMES DAILY
Qty: 180 TABLET | Refills: 3 | Status: SHIPPED | OUTPATIENT
Start: 2024-01-05

## 2024-01-05 RX ORDER — PROCHLORPERAZINE 25 MG/1
SUPPOSITORY RECTAL
Qty: 3 EACH | Refills: 3 | Status: SHIPPED | OUTPATIENT
Start: 2024-01-05

## 2024-01-05 RX ORDER — TESTOSTERONE CYPIONATE 200 MG/ML
100 VIAL (ML) INTRAMUSCULAR
Qty: 4 ML | Refills: 5 | Status: SHIPPED | OUTPATIENT
Start: 2024-01-05

## 2024-01-05 RX ORDER — METFORMIN HYDROCHLORIDE 500 MG/1
1000 TABLET, EXTENDED RELEASE ORAL 2 TIMES DAILY WITH MEALS
Qty: 360 TABLET | Refills: 3 | Status: SHIPPED | OUTPATIENT
Start: 2024-01-05

## 2024-01-05 ASSESSMENT — ENCOUNTER SYMPTOMS
CONSTIPATION: 0
VOMITING: 0
SHORTNESS OF BREATH: 0
DIARRHEA: 0
NAUSEA: 0

## 2024-01-05 NOTE — PROGRESS NOTES
from Axiron to Xyosted 2/2021.  His insurance stopped covering Xyosted and he switched to testosterone cypionate 6/2023.    Labs:   5/9/2011: total testosterone 162 (250-1100), free testosterone 38.2 (), LH 4.0, FSH 5.4, prolactin 8.4, TSH 1.84, ferritin 149.   10/21/2011: total testosterone 272 (348-1197), free testosterone 6.66 (6-21).   1/20/2012: total testosterone 736.   7/13/2012: total testosterone 517, free testosterone 181.5.   1/11/2013: total testosterone 379, free testosterone 8.9.   8/9/2013: total testosterone 211.28.   1/10/2014: total testosterone 348, PSA 0.885, H/H 16.1/47.5.   1/9/2015: total testosterone 281, PSA 0.757, H/H 16.2/47.30.   8/7/2015: Total testosterone 368, hemoglobin 18.6, hematocrit 54.3, PSA 0.780.  10/30/2015: Total testosterone 362.  11/16/2015: Hemoglobin 17.7, hematocrit 52.1.  3/7/2016: Total testosterone 499, hemoglobin 17.3, hematocrit 49.9.  12/12/2016: Total testosterone 498, hemoglobin 16.6, hematocrit 49, PSA 0.602.  4/20/2017: Total testosterone 485.    8/22/2017: Total testosterone 328.  4/25/2018: Total testosterone 268, hemoglobin 16.8, hematocrit 48.2, PSA 0.685  2/6/2019: Total testosterone 380, hemoglobin 16.6, hematocrit 51.1, PSA 2.58.  3/28/2019: PSA 1.38.  12/5/2019: Total testosterone 405, hemoglobin 16.3, hematocrit 48.4, PSA 0.959.  2/23/2021: Total testosterone 449, hemoglobin 16.2, hematocrit 47.6, PSA 1.25.  6/25/2021: Total testosterone 258.0.  5/11/2022: Total testosterone 407, hemoglobin 17.0, hematocrit 50.5, PSA 0.878.  7/3/2023: Total testosterone 515, hemoglobin 17.2, hematocrit 50.4, PSA 1.510.  1/3/2024: Total testosterone 517, hemoglobin 18.9, hematocrit 57.0, PSA 1.320.      Review of Systems   Constitutional:  Negative for fatigue.        Weight decreased 2 pounds since last visit.   Respiratory:  Negative for shortness of breath.    Cardiovascular:  Negative for chest pain.   Gastrointestinal:  Negative for constipation, diarrhea,

## 2024-02-07 DIAGNOSIS — Z79.4 UNCONTROLLED TYPE 2 DIABETES MELLITUS WITH HYPERGLYCEMIA, WITH LONG-TERM CURRENT USE OF INSULIN (HCC): ICD-10-CM

## 2024-02-07 DIAGNOSIS — E11.65 UNCONTROLLED TYPE 2 DIABETES MELLITUS WITH HYPERGLYCEMIA, WITH LONG-TERM CURRENT USE OF INSULIN (HCC): ICD-10-CM

## 2024-02-07 RX ORDER — GLIMEPIRIDE 1 MG/1
1 TABLET ORAL 2 TIMES DAILY
Qty: 180 TABLET | Refills: 3 | OUTPATIENT
Start: 2024-02-07

## 2024-06-27 ENCOUNTER — TELEPHONE (OUTPATIENT)
Dept: ENDOCRINOLOGY | Age: 54
End: 2024-06-27

## 2024-06-27 NOTE — TELEPHONE ENCOUNTER
Outside labs reviewed.    6/25/2024: Hemoglobin A1c 8.2, creatinine 0.74 (), total cholesterol 129, triglycerides 263, HDL 38, LDL 60, total testosterone 538, hemoglobin 15.6, hematocrit 44.7.

## 2024-06-28 ENCOUNTER — OFFICE VISIT (OUTPATIENT)
Dept: ENDOCRINOLOGY | Age: 54
End: 2024-06-28

## 2024-06-28 VITALS
BODY MASS INDEX: 26.69 KG/M2 | SYSTOLIC BLOOD PRESSURE: 98 MMHG | DIASTOLIC BLOOD PRESSURE: 60 MMHG | WEIGHT: 186 LBS | HEART RATE: 89 BPM | OXYGEN SATURATION: 96 %

## 2024-06-28 DIAGNOSIS — E11.65 UNCONTROLLED TYPE 2 DIABETES MELLITUS WITH HYPERGLYCEMIA, WITH LONG-TERM CURRENT USE OF INSULIN (HCC): Primary | ICD-10-CM

## 2024-06-28 DIAGNOSIS — E23.0 HYPOGONADOTROPIC HYPOGONADISM IN MALE (HCC): ICD-10-CM

## 2024-06-28 DIAGNOSIS — E78.2 MIXED HYPERLIPIDEMIA: ICD-10-CM

## 2024-06-28 DIAGNOSIS — D75.1 POLYCYTHEMIA: ICD-10-CM

## 2024-06-28 DIAGNOSIS — I10 ESSENTIAL HYPERTENSION: ICD-10-CM

## 2024-06-28 DIAGNOSIS — Z79.4 UNCONTROLLED TYPE 2 DIABETES MELLITUS WITH HYPERGLYCEMIA, WITH LONG-TERM CURRENT USE OF INSULIN (HCC): Primary | ICD-10-CM

## 2024-06-28 RX ORDER — PEN NEEDLE, DIABETIC 32 GX 1/4"
NEEDLE, DISPOSABLE MISCELLANEOUS
Qty: 300 EACH | Refills: 3 | Status: SHIPPED | OUTPATIENT
Start: 2024-06-28

## 2024-06-28 RX ORDER — METFORMIN HYDROCHLORIDE 500 MG/1
1000 TABLET, EXTENDED RELEASE ORAL 2 TIMES DAILY WITH MEALS
Qty: 360 TABLET | Refills: 3 | Status: SHIPPED | OUTPATIENT
Start: 2024-06-28

## 2024-06-28 RX ORDER — INSULIN GLARGINE 100 [IU]/ML
25 INJECTION, SOLUTION SUBCUTANEOUS NIGHTLY
Qty: 30 ML | Refills: 3 | Status: SHIPPED | OUTPATIENT
Start: 2024-06-28

## 2024-06-28 RX ORDER — GLIMEPIRIDE 1 MG/1
1 TABLET ORAL 2 TIMES DAILY
Qty: 180 TABLET | Refills: 3 | Status: SHIPPED | OUTPATIENT
Start: 2024-06-28

## 2024-06-28 RX ORDER — TIRZEPATIDE 15 MG/.5ML
15 INJECTION, SOLUTION SUBCUTANEOUS WEEKLY
Qty: 6 ML | Refills: 3 | Status: SHIPPED | OUTPATIENT
Start: 2024-06-28

## 2024-06-28 RX ORDER — ACYCLOVIR 400 MG/1
TABLET ORAL
Qty: 9 EACH | Refills: 3 | Status: SHIPPED | OUTPATIENT
Start: 2024-06-28

## 2024-06-28 RX ORDER — TIRZEPATIDE 12.5 MG/.5ML
12.5 INJECTION, SOLUTION SUBCUTANEOUS WEEKLY
Qty: 6 ML | Refills: 3 | Status: SHIPPED | OUTPATIENT
Start: 2024-06-28

## 2024-06-28 RX ORDER — LISINOPRIL 10 MG/1
10 TABLET ORAL DAILY
Qty: 90 TABLET | Refills: 3 | Status: SHIPPED | OUTPATIENT
Start: 2024-06-28

## 2024-06-28 RX ORDER — EMPAGLIFLOZIN 25 MG/1
25 TABLET, FILM COATED ORAL DAILY
Qty: 90 TABLET | Refills: 3 | Status: SHIPPED | OUTPATIENT
Start: 2024-06-28

## 2024-06-28 RX ORDER — INSULIN ASPART 100 [IU]/ML
INJECTION, SOLUTION INTRAVENOUS; SUBCUTANEOUS
Qty: 30 ML | Refills: 3 | Status: SHIPPED | OUTPATIENT
Start: 2024-06-28

## 2024-06-28 RX ORDER — ATORVASTATIN CALCIUM 20 MG/1
20 TABLET, FILM COATED ORAL NIGHTLY
Qty: 90 TABLET | Refills: 3 | Status: SHIPPED | OUTPATIENT
Start: 2024-06-28

## 2024-06-28 RX ORDER — ICOSAPENT ETHYL 1 G/1
CAPSULE ORAL
Qty: 360 CAPSULE | Refills: 3 | Status: SHIPPED | OUTPATIENT
Start: 2024-06-28

## 2024-06-28 RX ORDER — TESTOSTERONE CYPIONATE 200 MG/ML
100 VIAL (ML) INTRAMUSCULAR
Qty: 4 ML | Refills: 5 | Status: SHIPPED | OUTPATIENT
Start: 2024-06-28

## 2024-06-28 ASSESSMENT — ENCOUNTER SYMPTOMS
NAUSEA: 0
CONSTIPATION: 0
DIARRHEA: 0
VOMITING: 0
SHORTNESS OF BREATH: 0

## 2024-06-28 NOTE — PROGRESS NOTES
LDL 34, LFTs normal.   10/21/2011: , , HDL 33.   1/20/2012: , , HDL 35.   1/11/2013: , , HDL 43, LDL18.8.   8/9/2013: , , HDL 32, LDL 20, VLDL 96.   10/2/2013: , , HDL 41.   1/10/2014: , , HDL 38, LDL 53.2, VLDL 41.8.   1/9/2015: , , HDL 36, LDL 49, VLDL 58.   3/7/2016: Total cholesterol 187, triglycerides 655, HDL 35, LDL 66.  7/13/2016: Total cholesterol 137, triglycerides 275, HDL 30, LDL 52, VLDL 55.  4/20/2017: Total cholesterol 147, triglycerides 155, HDL 34, LDL 82, VLDL 31.    8/22/2017: Total cholesterol 148, triglycerides 376, HDL 30, LDL 42.8, VLDL 75.2 (out of fenofibrate).   4/25/2018: Total cholesterol 140, triglycerides 292, HDL 32, LDL 49.6.  2/6/2019: Total cholesterol 131, triglycerides 140, HDL 37, LDL 66, VLDL 28.  12/5/2019: Total cholesterol 148, triglycerides 251, HDL 39, LDL 58.8, VLDL 50.2.  4/21/2020: Total cholesterol 140, triglycerides 195, HDL 44, LDL 57, VLDL 39.  2/23/2021: Total cholesterol 193, triglycerides 307, HDL 39, LDL 92.6, VLDL 61.4.  5/11/2022: Total cholesterol 181, triglycerides 381, LDL 73.8, VLDL 76.2.  7/3/2023: Total cholesterol 111, triglycerides 245, HDL 34, LDL 28.0, VLDL 49.0.  1/3/2024: Total cholesterol 162, triglycerides 388, HDL 39, LDL 45.4, VLDL 77.6.  6/25/2024: Total cholesterol 129, triglycerides 263, HDL 38, LDL 60.    TSH:   7/13/2012: TSH 1.20.   8/9/2013: TSH 1.560.   3/7/2016: TSH 1.55.  4/20/2017: TSH 1.51.    8/22/2017: TSH 2.15.  4/25/2018: TSH 2.  2/6/2019: TSH 2.29.  12/5/2019: TSH 1.71.  5/11/2022: TSH 1.680.  7/3/2023: TSH 1.280.  1/3/2024: TSH 1.260.    Retinopathy: Eye exam 8/2023 - no retinopathy.         HYPOGONADISM    Presentation: Hypogonadotropic hypogonadism.     Treatment: He was taking Androgel 1.62% and his PCP switched him to Axiron due to insurance. His PCP lowered his Axiron in 8/2015 when his hemoglobin was high.  He saw urology regarding his

## 2024-12-05 ENCOUNTER — TELEPHONE (OUTPATIENT)
Dept: ENDOCRINOLOGY | Age: 54
End: 2024-12-05

## 2024-12-05 NOTE — TELEPHONE ENCOUNTER
12/3/2024: Hemoglobin A1c 7.9, creatinine 0.79 (), urine albumin/creatinine 4, total cholesterol 148, triglycerides 154, HDL 45, LDL 77, TSH 1.76, total testosterone 381, hemoglobin 16.8, hematocrit 52.2, PSA 1.06.

## 2024-12-06 ENCOUNTER — OFFICE VISIT (OUTPATIENT)
Dept: ENDOCRINOLOGY | Age: 54
End: 2024-12-06

## 2024-12-06 VITALS
OXYGEN SATURATION: 97 % | SYSTOLIC BLOOD PRESSURE: 108 MMHG | BODY MASS INDEX: 26.48 KG/M2 | RESPIRATION RATE: 16 BRPM | HEIGHT: 70 IN | WEIGHT: 185 LBS | DIASTOLIC BLOOD PRESSURE: 64 MMHG | HEART RATE: 72 BPM

## 2024-12-06 DIAGNOSIS — E11.65 UNCONTROLLED TYPE 2 DIABETES MELLITUS WITH HYPERGLYCEMIA, WITH LONG-TERM CURRENT USE OF INSULIN (HCC): Primary | ICD-10-CM

## 2024-12-06 DIAGNOSIS — E23.0 HYPOGONADOTROPIC HYPOGONADISM IN MALE (HCC): ICD-10-CM

## 2024-12-06 DIAGNOSIS — E78.2 MIXED HYPERLIPIDEMIA: ICD-10-CM

## 2024-12-06 DIAGNOSIS — I10 ESSENTIAL HYPERTENSION: ICD-10-CM

## 2024-12-06 DIAGNOSIS — Z79.4 UNCONTROLLED TYPE 2 DIABETES MELLITUS WITH HYPERGLYCEMIA, WITH LONG-TERM CURRENT USE OF INSULIN (HCC): Primary | ICD-10-CM

## 2024-12-06 DIAGNOSIS — D75.1 POLYCYTHEMIA: ICD-10-CM

## 2024-12-06 RX ORDER — METFORMIN HYDROCHLORIDE 500 MG/1
1000 TABLET, EXTENDED RELEASE ORAL 2 TIMES DAILY WITH MEALS
Qty: 360 TABLET | Refills: 3 | Status: SHIPPED | OUTPATIENT
Start: 2024-12-06

## 2024-12-06 RX ORDER — PEN NEEDLE, DIABETIC 32 GX 1/4"
NEEDLE, DISPOSABLE MISCELLANEOUS
Qty: 300 EACH | Refills: 3 | Status: SHIPPED | OUTPATIENT
Start: 2024-12-06

## 2024-12-06 RX ORDER — ATORVASTATIN CALCIUM 20 MG/1
20 TABLET, FILM COATED ORAL NIGHTLY
Qty: 90 TABLET | Refills: 3 | Status: SHIPPED | OUTPATIENT
Start: 2024-12-06

## 2024-12-06 RX ORDER — ACYCLOVIR 400 MG/1
TABLET ORAL
Qty: 9 EACH | Refills: 3 | Status: SHIPPED | OUTPATIENT
Start: 2024-12-06

## 2024-12-06 RX ORDER — INSULIN ASPART 100 [IU]/ML
INJECTION, SOLUTION INTRAVENOUS; SUBCUTANEOUS
Qty: 30 ML | Refills: 3 | Status: SHIPPED | OUTPATIENT
Start: 2024-12-06

## 2024-12-06 RX ORDER — LISINOPRIL 10 MG/1
10 TABLET ORAL DAILY
Qty: 90 TABLET | Refills: 3 | Status: SHIPPED | OUTPATIENT
Start: 2024-12-06

## 2024-12-06 RX ORDER — ICOSAPENT ETHYL 1 G/1
CAPSULE ORAL
Qty: 360 CAPSULE | Refills: 3 | Status: SHIPPED | OUTPATIENT
Start: 2024-12-06

## 2024-12-06 RX ORDER — EMPAGLIFLOZIN 25 MG/1
25 TABLET, FILM COATED ORAL DAILY
Qty: 90 TABLET | Refills: 3 | Status: SHIPPED | OUTPATIENT
Start: 2024-12-06

## 2024-12-06 RX ORDER — INSULIN GLARGINE 100 [IU]/ML
30 INJECTION, SOLUTION SUBCUTANEOUS NIGHTLY
Qty: 30 ML | Refills: 3 | Status: SHIPPED | OUTPATIENT
Start: 2024-12-06

## 2024-12-06 RX ORDER — TIRZEPATIDE 15 MG/.5ML
15 INJECTION, SOLUTION SUBCUTANEOUS WEEKLY
Qty: 6 ML | Refills: 3 | Status: SHIPPED | OUTPATIENT
Start: 2024-12-06

## 2024-12-06 RX ORDER — TESTOSTERONE CYPIONATE 200 MG/ML
100 VIAL (ML) INTRAMUSCULAR
Qty: 4 ML | Refills: 5 | Status: SHIPPED | OUTPATIENT
Start: 2024-12-06

## 2024-12-06 RX ORDER — GLIMEPIRIDE 1 MG/1
1 TABLET ORAL 2 TIMES DAILY
Qty: 180 TABLET | Refills: 3 | Status: SHIPPED | OUTPATIENT
Start: 2024-12-06

## 2024-12-06 ASSESSMENT — ENCOUNTER SYMPTOMS
CONSTIPATION: 0
NAUSEA: 0
SHORTNESS OF BREATH: 0
DIARRHEA: 0
VOMITING: 0

## 2024-12-06 NOTE — PROGRESS NOTES
ferritin 149.   10/21/2011: total testosterone 272 (348-1197), free testosterone 6.66 (6-21).   1/20/2012: total testosterone 736.   7/13/2012: total testosterone 517, free testosterone 181.5.   1/11/2013: total testosterone 379, free testosterone 8.9.   8/9/2013: total testosterone 211.28.   1/10/2014: total testosterone 348, PSA 0.885, H/H 16.1/47.5.   1/9/2015: total testosterone 281, PSA 0.757, H/H 16.2/47.30.   8/7/2015: Total testosterone 368, hemoglobin 18.6, hematocrit 54.3, PSA 0.780.  10/30/2015: Total testosterone 362.  11/16/2015: Hemoglobin 17.7, hematocrit 52.1.  3/7/2016: Total testosterone 499, hemoglobin 17.3, hematocrit 49.9.  12/12/2016: Total testosterone 498, hemoglobin 16.6, hematocrit 49, PSA 0.602.  4/20/2017: Total testosterone 485.    8/22/2017: Total testosterone 328.  4/25/2018: Total testosterone 268, hemoglobin 16.8, hematocrit 48.2, PSA 0.685  2/6/2019: Total testosterone 380, hemoglobin 16.6, hematocrit 51.1, PSA 2.58.  3/28/2019: PSA 1.38.  12/5/2019: Total testosterone 405, hemoglobin 16.3, hematocrit 48.4, PSA 0.959.  2/23/2021: Total testosterone 449, hemoglobin 16.2, hematocrit 47.6, PSA 1.25.  6/25/2021: Total testosterone 258.0.  5/11/2022: Total testosterone 407, hemoglobin 17.0, hematocrit 50.5, PSA 0.878.  7/3/2023: Total testosterone 515, hemoglobin 17.2, hematocrit 50.4, PSA 1.510.  1/3/2024: Total testosterone 517, hemoglobin 18.9, hematocrit 57.0, PSA 1.320.  6/25/2024: Total testosterone 538, hemoglobin 15.6, hematocrit 44.7.  12/3/2024: Total testosterone 381, hemoglobin 16.8, hematocrit 52.2, PSA 1.06.      Review of Systems   Constitutional:  Negative for fatigue and unexpected weight change.   Respiratory:  Negative for shortness of breath.    Cardiovascular:  Negative for chest pain.   Gastrointestinal:  Negative for constipation, diarrhea, nausea and vomiting.       Vital Signs:  /64 (Site: Right Upper Arm, Position: Sitting, Cuff Size: Medium Adult)

## 2025-05-22 ENCOUNTER — PATIENT MESSAGE (OUTPATIENT)
Dept: ENDOCRINOLOGY | Age: 55
End: 2025-05-22

## 2025-05-22 DIAGNOSIS — D75.1 POLYCYTHEMIA: ICD-10-CM

## 2025-05-22 DIAGNOSIS — E11.65 CONTROLLED TYPE 2 DIABETES MELLITUS WITH HYPERGLYCEMIA, WITH LONG-TERM CURRENT USE OF INSULIN (HCC): Primary | ICD-10-CM

## 2025-05-22 DIAGNOSIS — E23.0 HYPOGONADOTROPIC HYPOGONADISM IN MALE: ICD-10-CM

## 2025-05-22 DIAGNOSIS — Z79.4 CONTROLLED TYPE 2 DIABETES MELLITUS WITH HYPERGLYCEMIA, WITH LONG-TERM CURRENT USE OF INSULIN (HCC): Primary | ICD-10-CM

## 2025-05-22 DIAGNOSIS — E78.2 MIXED HYPERLIPIDEMIA: ICD-10-CM

## 2025-06-14 DIAGNOSIS — E23.0 HYPOGONADOTROPIC HYPOGONADISM IN MALE: ICD-10-CM

## 2025-06-16 RX ORDER — TESTOSTERONE CYPIONATE 200 MG/ML
INJECTION, SOLUTION INTRAMUSCULAR
Qty: 4 ML | Refills: 5 | Status: SHIPPED | OUTPATIENT
Start: 2025-06-16 | End: 2025-12-19

## 2025-07-01 ENCOUNTER — TELEPHONE (OUTPATIENT)
Dept: ENDOCRINOLOGY | Age: 55
End: 2025-07-01

## 2025-07-01 NOTE — TELEPHONE ENCOUNTER
APPROVAL:      Ronnie Troncoso ([place]: [id number])  Mounjaro 15MG/0.5ML auto-injectors  Form  [facility] Electronic Prior Authorization Form (2017 NCPDP)  Message from Plan  Request Reference Number: [id number]. MOUNJARO INJ 15MG/0.5 is approved through 07/01/2026. Your patient may now fill this prescription and it will be covered.. Authorization Expiration Date: July 1, 2026.

## 2025-07-01 NOTE — TELEPHONE ENCOUNTER
Pharmacy initiated PA sent to [facility] on [facility]:    Ronnie Troncoso ([place]: [id number])  Mounjaro 15MG/0.5ML auto-injectors  Form  [facility] Electronic Prior Authorization Form (2017 NCPDP)